# Patient Record
Sex: FEMALE | Race: BLACK OR AFRICAN AMERICAN | URBAN - METROPOLITAN AREA
[De-identification: names, ages, dates, MRNs, and addresses within clinical notes are randomized per-mention and may not be internally consistent; named-entity substitution may affect disease eponyms.]

---

## 2020-06-04 ENCOUNTER — OFFICE VISIT (OUTPATIENT)
Dept: URBAN - METROPOLITAN AREA CLINIC 17 | Facility: CLINIC | Age: 75
End: 2020-06-04

## 2021-04-27 ENCOUNTER — WEB ENCOUNTER (OUTPATIENT)
Dept: URBAN - METROPOLITAN AREA CLINIC 17 | Facility: CLINIC | Age: 76
End: 2021-04-27

## 2021-04-27 ENCOUNTER — OFFICE VISIT (OUTPATIENT)
Dept: URBAN - METROPOLITAN AREA CLINIC 17 | Facility: CLINIC | Age: 76
End: 2021-04-27
Payer: COMMERCIAL

## 2021-04-27 DIAGNOSIS — D61.818 PANCYTOPENIA: ICD-10-CM

## 2021-04-27 DIAGNOSIS — K13.79 MOUTH SORES: ICD-10-CM

## 2021-04-27 DIAGNOSIS — D18.03 LIVER HEMANGIOMA: ICD-10-CM

## 2021-04-27 DIAGNOSIS — K58.2 IRRITABLE BOWEL SYNDROME WITH BOTH CONSTIPATION AND DIARRHEA: ICD-10-CM

## 2021-04-27 DIAGNOSIS — R63.6 UNDERWEIGHT: ICD-10-CM

## 2021-04-27 DIAGNOSIS — I10 ESSENTIAL HYPERTENSION: ICD-10-CM

## 2021-04-27 DIAGNOSIS — Z12.11 SCREEN FOR COLON CANCER: ICD-10-CM

## 2021-04-27 DIAGNOSIS — R74.8 ELEVATED LIVER ENZYMES: ICD-10-CM

## 2021-04-27 DIAGNOSIS — K76.0 HEPATIC STEATOSIS: ICD-10-CM

## 2021-04-27 PROCEDURE — 99213 OFFICE O/P EST LOW 20 MIN: CPT | Performed by: INTERNAL MEDICINE

## 2021-04-27 RX ORDER — LACTOBACIL 2/BIFIDO 1/S.THERMO 900B CELL
AS DIRECTED PACKET (EA) ORAL ONCE A DAY
Qty: 90 | Refills: 0 | OUTPATIENT
Start: 2021-04-27 | End: 2021-07-26

## 2021-04-27 RX ORDER — ASCORBIC ACID 500 MG
TABLET ORAL
Qty: 0 | Refills: 0 | Status: ACTIVE | COMMUNITY
Start: 1900-01-01

## 2021-04-27 RX ORDER — METOPROLOL SUCCINATE 50 MG/1
TAKE 1 TABLET (50 MG) BY ORAL ROUTE ONCE DAILY TABLET, EXTENDED RELEASE ORAL 1
Qty: 0 | Refills: 0 | Status: ACTIVE | COMMUNITY
Start: 1900-01-01

## 2021-04-27 NOTE — HPI-TODAY'S VISIT:
74 yo lady pt of DR [facility] Val[facility]. She has recently been diagnosed by Dr Kisha Villarreal with IBS last year. She has been worked up with EGD and colonoscopy, stool studies, CT Scan and medication trials. We have no records now. SHe has been tried on Imodium, Viberzi, questran, creon. She says the only thing which helped was Viberzi. She is frustrated with the fact of having to take meds because when she stops the Viberzi symptoms. She is also concerned that she gets mouth sores which she treats with salt water. She has also lost about 8 lbs in 9 months. Her current symptoms include mid abdominal spasms "I have had that since my child of 44 yrs" which she has rarely, diarrhea (oranges set her off and most fruit and so now she avoids them as well as fried foods). She will typically has a BM 3-4 times a day, varying from formed to watery stools. She has diarrhea about 4 days out of the week. On non diarrheal days she will have a formed stool about twice a day. She will have nocturnal stools on diarrheal days. Symptoms have been ongoing for one year. 4/9/19 We still have no records from DR Villarreal. Reviewed records from [facility] - labs from 1/2019 show pancytopenia Hb 10, plts 134, wbc 2.9. Says her counts is always low. has seen hematology. Stools are more formed on low fodmaps diet - she is quite satisfied. She is avoiding onions which she loves but this is helping. She is having about 1-2 BMs a day, no nocturnal stools. no abdominal spasms.  5/14/19 Reviewed several pages of records from DR Villarreal - colonoscopy, bx, CTA w and w/o contrast. Interestingly CT from 8/2018 shows large amount of stool in rectum Last colonoscopy was from 2018, unremarkable, repeat in 5 yrs recommended. Says she has 3-4 days of "diarrhea" with 1-2 loose stools. Mostly she feels incompletely evacuated.  6/11/19 She got some mouth ulcers and saw ENT who gave her a miracle mouth wash but her insurance would not cover it. She got liquid lidocaine instead. She wonders if it is nutritional. last colonoscopy in 2018 showed only some colon erythema with neg bx. She is still on a fodmaps diet and stopped eating some of her usual fruits. She is now gradually starting to put fruits back in her diet. She took a prep kit after her last visit. cleaned out . Started benefiber bid "it actually helped". And she did very well. Up until memorial day when she ate grilled chicken got diarrhea and then took viberzi. She is no longer having diarrhea. She usually has about 2 stools a day.  7/16/19 "I am doing much better". SHe states that she only gets flare ups on rare occasions - some weekends, some holidays. She is now noting her trigger foods and avoiding them - last time was left over ribs from a cookout, and then garlic, white onions. She has taken xifaxan prn for flare ups. She has had these about once every 2 weeks she thinks and a few doses of xifaxan would clear this up. Flare ups are usually looses stools and sometimes watery. She is taking Align daily and takes benefiber about once a day.  8/20/19 Doing overall better. She has had to take Xifaxan about 6 times since her last visit. Those times she had eaten some of her trigger foods including broccoli, SAys those times when she eats those foods are things she really enjoys but still craves. She has a BM daily. Usually 2 BMs a day, usually soft formed stools. She is not taking Benefiber daily but takes Align daily.  11/5/19 States she is doing better. Taking xifaxan about once a week "for my bouts". She states when she eats a trigger food, it takes about 3-4 days to get it out. She admits that a low fodmap diet is difficult to adhere to. She has a BM daily where she feels well evacuated, formed "almost like normal". She takes benefiber and Align daily.  2/6/20 doing well. stable. Over the holidays, ate a lot things that she should not "I had to eat them". she had a lot of loose stools as a result. This improved after she started eating small cup of plain jello daily.  4/27/21 Has gained 7lbs since her last visit! She has eaten more.  Still having issues with her bowels - rare vague abdominal discomforts which is unchanged.  She says some days she has a normal stool and some days looser stools "its not like when I started coming to you which was 2-3 times a day". She has a BM most days of the week and feels well evacuated.  She says she has never been constipated.  She has stopped taking Align "I didnt see that it was helping me". SHe is eating more yoghurt and eating kefir mild.  She says her PCP recently told her liver enzymes were elevated.  US 4/2021 which she pulls up on her phone shows hepatic steatosis and a 1 cm most likely hemangioma.  She is still working and looking to retire at the end of the year.

## 2021-07-29 ENCOUNTER — LAB OUTSIDE AN ENCOUNTER (OUTPATIENT)
Dept: URBAN - METROPOLITAN AREA CLINIC 17 | Facility: CLINIC | Age: 76
End: 2021-07-29

## 2021-07-29 ENCOUNTER — DASHBOARD ENCOUNTERS (OUTPATIENT)
Age: 76
End: 2021-07-29

## 2021-07-29 ENCOUNTER — OFFICE VISIT (OUTPATIENT)
Dept: URBAN - METROPOLITAN AREA CLINIC 17 | Facility: CLINIC | Age: 76
End: 2021-07-29
Payer: COMMERCIAL

## 2021-07-29 DIAGNOSIS — Z12.11 SCREEN FOR COLON CANCER: ICD-10-CM

## 2021-07-29 DIAGNOSIS — D18.03 LIVER HEMANGIOMA: ICD-10-CM

## 2021-07-29 DIAGNOSIS — D61.818 PANCYTOPENIA: ICD-10-CM

## 2021-07-29 DIAGNOSIS — R74.8 ELEVATED AMYLASE: ICD-10-CM

## 2021-07-29 DIAGNOSIS — R63.6 UNDERWEIGHT: ICD-10-CM

## 2021-07-29 DIAGNOSIS — I10 ESSENTIAL HYPERTENSION: ICD-10-CM

## 2021-07-29 DIAGNOSIS — K58.2 IRRITABLE BOWEL SYNDROME WITH BOTH CONSTIPATION AND DIARRHEA: ICD-10-CM

## 2021-07-29 DIAGNOSIS — K13.79 MOUTH SORES: ICD-10-CM

## 2021-07-29 DIAGNOSIS — K76.0 HEPATIC STEATOSIS: ICD-10-CM

## 2021-07-29 PROBLEM — 197321007: Status: ACTIVE | Noted: 2021-04-27

## 2021-07-29 PROBLEM — 59621000: Status: ACTIVE | Noted: 2021-04-27

## 2021-07-29 PROBLEM — 10743008: Status: ACTIVE | Noted: 2021-04-27

## 2021-07-29 PROBLEM — 127034005: Status: ACTIVE | Noted: 2021-04-27

## 2021-07-29 PROCEDURE — 99214 OFFICE O/P EST MOD 30 MIN: CPT | Performed by: INTERNAL MEDICINE

## 2021-07-29 RX ORDER — METOPROLOL SUCCINATE 50 MG/1
TAKE 1 TABLET (50 MG) BY ORAL ROUTE ONCE DAILY TABLET, EXTENDED RELEASE ORAL 1
Qty: 0 | Refills: 0 | Status: ACTIVE | COMMUNITY
Start: 1900-01-01

## 2021-07-29 RX ORDER — ASCORBIC ACID 500 MG
TABLET ORAL
Qty: 0 | Refills: 0 | Status: ACTIVE | COMMUNITY
Start: 1900-01-01

## 2021-07-29 NOTE — HPI-TODAY'S VISIT:
72 yo lady pt of DR Rosanna Valdez. She has recently been diagnosed by Dr Kisha Villarreal with IBS last year. She has been worked up with EGD and colonoscopy, stool studies, CT Scan and medication trials. We have no records now. SHe has been tried on Imodium, Viberzi, questran, creon. She says the only thing which helped was Viberzi. She is frustrated with the fact of having to take meds because when she stops the Viberzi symptoms. She is also concerned that she gets mouth sores which she treats with salt water. She has also lost about 8 lbs in 9 months. Her current symptoms include mid abdominal spasms "I have had that since my child of 44 yrs" which she has rarely, diarrhea (oranges set her off and most fruit and so now she avoids them as well as fried foods). She will typically has a BM 3-4 times a day, varying from formed to watery stools. She has diarrhea about 4 days out of the week. On non diarrheal days she will have a formed stool about twice a day. She will have nocturnal stools on diarrheal days. Symptoms have been ongoing for one year. 4/9/19 We still have no records from DR Villarreal. Reviewed records from [facility] - labs from 1/2019 show pancytopenia Hb 10, plts 134, wbc 2.9. Says her counts is always low. has seen hematology. Stools are more formed on low fodmaps diet - she is quite satisfied. She is avoiding onions which she loves but this is helping. She is having about 1-2 BMs a day, no nocturnal stools. no abdominal spasms.  5/14/19 Reviewed several pages of records from DR Villarreal - colonoscopy, bx, CTA w and w/o contrast. Interestingly CT from 8/2018 shows large amount of stool in rectum Last colonoscopy was from 2018, unremarkable, repeat in 5 yrs recommended. Says she has 3-4 days of "diarrhea" with 1-2 loose stools. Mostly she feels incompletely evacuated.  6/11/19 She got some mouth ulcers and saw ENT who gave her a miracle mouth wash but her insurance would not cover it. She got liquid lidocaine instead. She wonders if it is nutritional. last colonoscopy in 2018 showed only some colon erythema with neg bx. She is still on a fodmaps diet and stopped eating some of her usual fruits. She is now gradually starting to put fruits back in her diet. She took a prep kit after her last visit. cleaned out . Started benefiber bid "it actually helped". And she did very well. Up until memorial day when she ate grilled chicken got diarrhea and then took viberzi. She is no longer having diarrhea. She usually has about 2 stools a day.  7/16/19 "I am doing much better". SHe states that she only gets flare ups on rare occasions - some weekends, some holidays. She is now noting her trigger foods and avoiding them - last time was left over ribs from a cookout, and then garlic, white onions. She has taken xifaxan prn for flare ups. She has had these about once every 2 weeks she thinks and a few doses of xifaxan would clear this up. Flare ups are usually looses stools and sometimes watery. She is taking Align daily and takes benefiber about once a day.  8/20/19 Doing overall better. She has had to take Xifaxan about 6 times since her last visit. Those times she had eaten some of her trigger foods including broccoli, SAys those times when she eats those foods are things she really enjoys but still craves. She has a BM daily. Usually 2 BMs a day, usually soft formed stools. She is not taking Benefiber daily but takes Align daily.  11/5/19 States she is doing better. Taking xifaxan about once a week "for my bouts". She states when she eats a trigger food, it takes about 3-4 days to get it out. She admits that a low fodmap diet is difficult to adhere to. She has a BM daily where she feels well evacuated, formed "almost like normal". She takes benefiber and Align daily.  2/6/20 doing well. stable. Over the holidays, ate a lot things that she should not "I had to eat them". she had a lot of loose stools as a result. This improved after she started eating small cup of plain jello daily.  4/27/21 Has gained 7lbs since her last visit! She has eaten more.  Still having issues with her bowels - rare vague abdominal discomforts which is unchanged.  She says some days she has a normal stool and some days looser stools "its not like when I started coming to you which was 2-3 times a day". She has a BM most days of the week and feels well evacuated.  She says she has never been constipated.  She has stopped taking Align "I didnt see that it was helping me". SHe is eating more yoghurt and eating kefir mild.  She says her PCP recently told her liver enzymes were elevated.  US 4/2021 which she pulls up on her phone shows hepatic steatosis and a 1 cm most likely hemangioma.  She is still working and looking to retire at the end of the year.   7/29/21 doing better. gaining weight. BMs are good WE reviewed her labs from 3/2021 ALT 46, AST 45. nl alk phos US showing hepatic steatosis, non obstructive renal stone, R liver possible hemangioma. Pt states she had an MRI which showed hemangioma. I note labs showing wbc 3.1, platelets 136 and hb 10.7 with nl hct  She states she has only needed xifaxan twice since she last saw me/ requests samples.

## 2021-07-30 LAB
ALBUMIN: 3.9
ALKALINE PHOSPHATASE: 92
ALT (SGPT): 59
AST (SGOT): 59
BILIRUBIN, DIRECT: 0.13
BILIRUBIN, TOTAL: 0.3
HBSAG SCREEN: NEGATIVE
HEP A AB, IGM: NEGATIVE
HEP B CORE AB, IGM: NEGATIVE
HEP C VIRUS AB: <0.1
PROTEIN, TOTAL: 6.9

## 2022-01-27 ENCOUNTER — OFFICE VISIT (OUTPATIENT)
Dept: URBAN - METROPOLITAN AREA CLINIC 17 | Facility: CLINIC | Age: 77
End: 2022-01-27